# Patient Record
Sex: MALE | URBAN - NONMETROPOLITAN AREA
[De-identification: names, ages, dates, MRNs, and addresses within clinical notes are randomized per-mention and may not be internally consistent; named-entity substitution may affect disease eponyms.]

---

## 2024-11-06 ENCOUNTER — TELEPHONE ENCOUNTER (OUTPATIENT)
Dept: URBAN - NONMETROPOLITAN AREA CLINIC 2 | Facility: CLINIC | Age: 19
End: 2024-11-06

## 2024-11-06 ENCOUNTER — TELEPHONE ENCOUNTER (OUTPATIENT)
Dept: URBAN - NONMETROPOLITAN AREA CLINIC 13 | Facility: CLINIC | Age: 19
End: 2024-11-06

## 2024-11-06 ENCOUNTER — DASHBOARD ENCOUNTERS (OUTPATIENT)
Age: 19
End: 2024-11-06

## 2024-11-06 ENCOUNTER — OFFICE VISIT (OUTPATIENT)
Dept: URBAN - NONMETROPOLITAN AREA CLINIC 2 | Facility: CLINIC | Age: 19
End: 2024-11-06
Payer: MEDICARE

## 2024-11-06 VITALS
DIASTOLIC BLOOD PRESSURE: 60 MMHG | HEIGHT: 60 IN | WEIGHT: 95 LBS | HEART RATE: 100 BPM | BODY MASS INDEX: 18.65 KG/M2 | SYSTOLIC BLOOD PRESSURE: 87 MMHG

## 2024-11-06 DIAGNOSIS — E72.03 LOWE SYNDROME: ICD-10-CM

## 2024-11-06 DIAGNOSIS — K31.84 GASTROPARESIS: ICD-10-CM

## 2024-11-06 DIAGNOSIS — Z93.1 G TUBE FEEDINGS: ICD-10-CM

## 2024-11-06 PROBLEM — 302109006: Status: ACTIVE | Noted: 2024-11-06

## 2024-11-06 PROBLEM — 235675006: Status: ACTIVE | Noted: 2024-11-06

## 2024-11-06 PROCEDURE — 99204 OFFICE O/P NEW MOD 45 MIN: CPT | Performed by: NURSE PRACTITIONER

## 2024-11-06 RX ORDER — POTASSIUM CHLORIDE 20 MEQ/15ML
SOLUTION ORAL
Qty: 500 MILLILITER | Status: ACTIVE | COMMUNITY

## 2024-11-06 RX ORDER — OXCARBAZEPINE 60 MG/ML
SUSPENSION ORAL
Qty: 500 MILLILITER | Status: ACTIVE | COMMUNITY

## 2024-11-06 RX ORDER — NETARSUDIL 0.2 MG/ML
SOLUTION/ DROPS OPHTHALMIC; TOPICAL
Qty: 2.5 MILLILITER | Status: ACTIVE | COMMUNITY

## 2024-11-06 RX ORDER — LATANOPROST 50 UG/ML
SOLUTION/ DROPS OPHTHALMIC
Qty: 2.5 MILLILITER | Status: ACTIVE | COMMUNITY

## 2024-11-06 RX ORDER — LEVETIRACETAM 100 MG/ML
SOLUTION ORAL
Qty: 240 MILLILITER | Status: ACTIVE | COMMUNITY

## 2024-11-06 RX ORDER — ONDANSETRON 4 MG/1
TABLET, ORALLY DISINTEGRATING ORAL
Qty: 180 TABLET

## 2024-11-06 RX ORDER — ONDANSETRON 4 MG/1
TABLET, ORALLY DISINTEGRATING ORAL
Qty: 180 TABLET | Status: ACTIVE | COMMUNITY

## 2024-11-06 RX ORDER — BRIMONIDINE TARTRATE 2 MG/ML
SOLUTION/ DROPS OPHTHALMIC
Qty: 10 MILLILITER | Status: ACTIVE | COMMUNITY

## 2024-11-06 RX ORDER — DORZOLAMIDE HYDROCHLORIDE AND TIMOLOL MALEATE 20; 5 MG/ML; MG/ML
SOLUTION OPHTHALMIC
Qty: 10 MILLILITER | Status: ACTIVE | COMMUNITY

## 2024-11-06 RX ORDER — ERYTHROMYCIN ETHYLSUCCINATE 200 MG/5ML
GRANULE, FOR SUSPENSION ORAL
Qty: 300 MILLILITER
End: 2024-12-06

## 2024-11-06 RX ORDER — CALCITRIOL 1 UG/ML
SOLUTION ORAL
Qty: 15 MILLILITER | Status: ACTIVE | COMMUNITY

## 2024-11-06 RX ORDER — LEVOCARNITINE 1 G/10ML
SOLUTION ORAL
Qty: 120 MILLILITER | Status: ACTIVE | COMMUNITY

## 2024-11-06 RX ORDER — ERYTHROMYCIN ETHYLSUCCINATE 200 MG/5ML
GRANULE, FOR SUSPENSION ORAL
Qty: 300 MILLILITER | Status: ACTIVE | COMMUNITY

## 2024-11-06 NOTE — HPI-TODAY'S VISIT:
Mr. 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  He has seen a pediatric gastroenterologist at [facility] for most of his life.  GI manifestations of his condition include gastroparesis and he is dependent on formula feeding.  Mom states she has no concerns today.  She does not believe he is in any pain, and symptoms are well-managed with erythromycin and Zofran.  He has not had any vomiting nor bleeding.  Weight is stable.  She does share that they remove his Feliciano button between feedings because Fortino will pull it out.  She is in need of a refill of supplies as they transition care.  She shared that there was not an indication to proceed with any diagnostic interventions, and believes that Fortino feels well overall today.  LG.

## 2024-11-14 ENCOUNTER — TELEPHONE ENCOUNTER (OUTPATIENT)
Dept: URBAN - NONMETROPOLITAN AREA CLINIC 13 | Facility: CLINIC | Age: 19
End: 2024-11-14

## 2024-11-14 ENCOUNTER — TELEPHONE ENCOUNTER (OUTPATIENT)
Dept: URBAN - NONMETROPOLITAN AREA CLINIC 2 | Facility: CLINIC | Age: 19
End: 2024-11-14

## 2024-11-14 RX ORDER — ERYTHROMYCIN ETHYLSUCCINATE 200 MG/5ML
2.5 ML GRANULE, FOR SUSPENSION ORAL
Qty: 500 ML | Refills: 3

## 2024-11-14 RX ORDER — ONDANSETRON 4 MG/1
1 TABLET ON THE TONGUE AND ALLOW TO DISSOLVE TABLET, ORALLY DISINTEGRATING ORAL TWICE DAILY
Qty: 180 | Refills: 3

## 2024-11-14 RX ORDER — LANSOPRAZOLE 30 MG/1
1 TABLET TABLET, ORALLY DISINTEGRATING, DELAYED RELEASE ORAL TWICE DAILY
Qty: 60 | Refills: 3 | OUTPATIENT
Start: 2024-11-14

## 2025-05-07 ENCOUNTER — OFFICE VISIT (OUTPATIENT)
Dept: URBAN - NONMETROPOLITAN AREA CLINIC 2 | Facility: CLINIC | Age: 20
End: 2025-05-07

## 2025-07-02 ENCOUNTER — OFFICE VISIT (OUTPATIENT)
Dept: URBAN - NONMETROPOLITAN AREA CLINIC 2 | Facility: CLINIC | Age: 20
End: 2025-07-02